# Patient Record
Sex: MALE
[De-identification: names, ages, dates, MRNs, and addresses within clinical notes are randomized per-mention and may not be internally consistent; named-entity substitution may affect disease eponyms.]

---

## 2022-01-25 ENCOUNTER — HOSPITAL ENCOUNTER (EMERGENCY)
Dept: HOSPITAL 5 - ED | Age: 32
LOS: 1 days | Discharge: HOME | End: 2022-01-26
Payer: SELF-PAY

## 2022-01-25 VITALS — DIASTOLIC BLOOD PRESSURE: 95 MMHG | SYSTOLIC BLOOD PRESSURE: 144 MMHG

## 2022-01-25 DIAGNOSIS — R07.89: Primary | ICD-10-CM

## 2022-01-25 LAB
BASOPHILS # (AUTO): 0 K/MM3 (ref 0–0.1)
BASOPHILS NFR BLD AUTO: 0.5 % (ref 0–1.8)
EOSINOPHIL # BLD AUTO: 0.1 K/MM3 (ref 0–0.4)
EOSINOPHIL NFR BLD AUTO: 2.6 % (ref 0–4.3)
HCT VFR BLD CALC: 42.6 % (ref 35.5–45.6)
HGB BLD-MCNC: 14.4 GM/DL (ref 11.8–15.2)
LYMPHOCYTES # BLD AUTO: 1.2 K/MM3 (ref 1.2–5.4)
LYMPHOCYTES NFR BLD AUTO: 21.8 % (ref 13.4–35)
MCHC RBC AUTO-ENTMCNC: 34 % (ref 32–34)
MCV RBC AUTO: 98 FL (ref 84–94)
MONOCYTES # (AUTO): 0.6 K/MM3 (ref 0–0.8)
MONOCYTES % (AUTO): 10.2 % (ref 0–7.3)
PLATELET # BLD: 189 K/MM3 (ref 140–440)
RBC # BLD AUTO: 4.36 M/MM3 (ref 3.65–5.03)

## 2022-01-25 PROCEDURE — 93005 ELECTROCARDIOGRAM TRACING: CPT

## 2022-01-25 PROCEDURE — 36415 COLL VENOUS BLD VENIPUNCTURE: CPT

## 2022-01-25 PROCEDURE — 80053 COMPREHEN METABOLIC PANEL: CPT

## 2022-01-25 PROCEDURE — 84484 ASSAY OF TROPONIN QUANT: CPT

## 2022-01-25 PROCEDURE — 93010 ELECTROCARDIOGRAM REPORT: CPT

## 2022-01-25 PROCEDURE — 71046 X-RAY EXAM CHEST 2 VIEWS: CPT

## 2022-01-25 PROCEDURE — 99284 EMERGENCY DEPT VISIT MOD MDM: CPT

## 2022-01-25 PROCEDURE — 85025 COMPLETE CBC W/AUTO DIFF WBC: CPT

## 2022-01-25 NOTE — XRAY REPORT
CHEST 2 VIEWS 



INDICATION / CLINICAL INFORMATION: Chest pain.



COMPARISON: None available.



FINDINGS:



SUPPORT DEVICES: None.

HEART / MEDIASTINUM: No significant abnormality. 

LUNGS / PLEURA: No significant pulmonary or pleural abnormality. No pneumothorax. 



ADDITIONAL FINDINGS: No significant additional findings.



IMPRESSION:

1. No acute findings.



Signer Name: Ty Mathew MD 

Signed: 1/25/2022 11:51 PM

Workstation Name: [id number]

## 2022-01-26 LAB
ALBUMIN SERPL-MCNC: 4.5 G/DL (ref 3.9–5)
ALT SERPL-CCNC: 13 UNITS/L (ref 7–56)
BUN SERPL-MCNC: 11 MG/DL (ref 9–20)
BUN/CREAT SERPL: 12 %
CALCIUM SERPL-MCNC: 9.3 MG/DL (ref 8.4–10.2)
HEMOLYSIS INDEX: 124

## 2022-01-26 NOTE — ELECTROCARDIOGRAPH REPORT
[facility]

                                       

Test Date:    2022               Test Time:    23:20:17

Pat Name:     RADHA MÉNDEZ        Department:   

Patient ID:   [id number]          Room:          

Gender:       M                        Technician:   SOLEDAD

:          1990               Requested By: ED DOC

Order Number: [id number]              Reading MD:   Blayne Hoover

                                 Measurements

Intervals                              Axis          

Rate:         65                       P:            -35

DC:           190                      QRS:          40

QRSD:         89                       T:            33

QT:           393                                    

QTc:          408                                    

                           Interpretive Statements

Sinus rhythm

No previous ECG available for comparison

Electronically Signed On 2022 8:51:56 EST by Blayne Hoover

## 2022-01-26 NOTE — EMERGENCY DEPARTMENT REPORT
ED General Adult HPI





- General


Chief complaint: Chest Pain


Stated complaint: Chest wall pain after heavy lifting


Time Seen by Provider: 01/26/22 06:58


Source: patient, RN notes reviewed


Mode of arrival: Ambulatory


Limitations: No Limitations





- History of Present Illness


Initial comments: 





The patient was evaluated in the emergency department for symptoms described in 

the history of present illness.  He/she was evaluated in the context of the 

global COVID-19 pandemic, which necessitated consideration that the patient 

might be at risk for infection with the virus that causes COVID-19.  

Institutional protocols and algorithms that pertain to the evaluation of 

patients at risk for COVID-19 are in a state of rapid change based on 

information released by regulatory bodies including the CDC and federal and 

state organizations.  These policies and algorithms were followed during the 

patient's care in the emergency department.  Please note that these policies, 

procedures and recommendations changed on a rapid basis.





This patient is a 31-year-old gentleman, who is right-hand dominant, who does a 

lot of heavy lifting for work, who presents to the ER today with a complaint of 

anterior chest wall pain, present for a few days, which does not radiate to the 

back, arms or neck.  He denies vomiting, diaphoresis, shortness of breath, 

travel, surgery, immobilization, DVT/PE risk factors.


-: Gradual, days(s)


Location: chest


Radiation: non-radiation


Severity scale (0 -10): 5


Quality: aching


Consistency: constant


Improves with: rest


Worsens with: movement (Movement and palpation)





- Related Data


                                  Previous Rx's











 Medication  Instructions  Recorded  Last Taken  Type


 


Acetaminophen [Non-Aspirin Extra 650 mg PO Q6HR PRN #30 tablet 01/26/22 Unknown 

Rx





Strength]    


 


Ibuprofen [Motrin] 400 mg PO Q8H PRN #30 tablet 01/26/22 Unknown Rx


 


Nicotine Polacrilex [Nicotine Gum] 4 mg BC PRN #1 pack 01/26/22 Unknown Rx











                                    Allergies











Allergy/AdvReac Type Severity Reaction Status Date / Time


 


shellfish derived Allergy  Unknown Verified 01/25/22 23:20














ED Review of Systems


ROS: 


Stated complaint: CHEST PAINS


Other details as noted in HPI





Comment: All other systems reviewed and negative


Cardiovascular: chest pain (Anterior chest wall pain)





ED Past Medical Hx





- Past Medical History


Previous Medical History?: No





- Medications


Home Medications: 


                                Home Medications











 Medication  Instructions  Recorded  Confirmed  Last Taken  Type


 


Acetaminophen [Non-Aspirin Extra 650 mg PO Q6HR PRN #30 tablet 01/26/22  Unknown

 Rx





Strength]     


 


Ibuprofen [Motrin] 400 mg PO Q8H PRN #30 tablet 01/26/22  Unknown Rx


 


Nicotine Polacrilex [Nicotine Gum] 4 mg BC PRN #1 pack 01/26/22  Unknown Rx














ED Physical Exam





- General


Limitations: No Limitations


General appearance: alert, in no apparent distress





- Head


Head exam: Present: atraumatic, normocephalic





- Eye


Eye exam: Present: normal appearance, EOMI.  Absent: nystagmus





- ENT


ENT exam: Present: normal exam, normal orophraynx, mucous membranes moist, 

normal external ear exam





- Neck


Neck exam: Present: normal inspection, full ROM.  Absent: tenderness, 

meningismus





- Respiratory


Respiratory exam: Present: normal lung sounds bilaterally, chest wall 

tenderness.  Absent: respiratory distress, wheezes, rales, rhonchi, stridor





- Cardiovascular


Cardiovascular Exam: Present: regular rate, normal rhythm, normal heart sounds. 

Absent: bradycardia, tachycardia, irregular rhythm, systolic murmur, diastolic 

murmur, rubs, gallop





- GI/Abdominal


GI/Abdominal exam: Present: soft, normal bowel sounds.  Absent: distended, 

tenderness, guarding, rebound, rigid, pulsatile mass





- Rectal


Rectal exam: Present: deferred





- Extremities Exam


Extremities exam: Present: normal inspection, full ROM, other (2+ pulses noted i

n the bilateral upper and lower extremities.  There is no palpable cord.   

negative Homans sign.  Muscular compartments are soft.  The pelvis is stable.). 

Absent: pedal edema, calf tenderness





- Back Exam


Back exam: Present: normal inspection, full ROM.  Absent: tenderness, CVA 

tenderness (R), CVA tenderness (L), paraspinal tenderness, vertebral tenderness





- Neurological Exam


Neurological exam: Present: alert, oriented X3, normal gait, other (No facial 

droop.  Tongue midline.  Extraocular movements intact bilaterally.  Facial 

sensation intact to light touch in V1, V2, V3 distribution bilaterally.  5 and a

5 strength in 4 extremities.  Sensation intact to light touch in 4 

extremities.).  Absent: motor sensory deficit





- Psychiatric


Psychiatric exam: Present: normal affect, normal mood





- Skin


Skin exam: Present: warm, dry, intact, normal color.  Absent: rash





ED Course


                                   Vital Signs











  01/25/22





  23:23


 


Temperature 98.9 F


 


Pulse Rate 72


 


Respiratory 20





Rate 


 


Blood Pressure 144/95





[Right] 


 


O2 Sat by Pulse 98





Oximetry 














ED Medical Decision Making





- Lab Data


Result diagrams: 


                                 01/25/22 23:34





                                 01/25/22 23:34








                                   Vital Signs











  01/25/22





  23:23


 


Temperature 98.9 F


 


Pulse Rate 72


 


Respiratory 20





Rate 


 


Blood Pressure 144/95





[Right] 


 


O2 Sat by Pulse 98





Oximetry 











                                   Lab Results











  01/25/22 01/25/22 01/26/22 Range/Units





  23:34 23:34 02:49 


 


WBC  5.6    (4.5-11.0)  K/mm3


 


RBC  4.36    (3.65-5.03)  M/mm3


 


Hgb  14.4    (11.8-15.2)  gm/dl


 


Hct  42.6    (35.5-45.6)  %


 


MCV  98 H    (84-94)  fl


 


MCH  33 H    (28-32)  pg


 


MCHC  34    (32-34)  %


 


RDW  12.9 L    (13.2-15.2)  %


 


Plt Count  189    (140-440)  K/mm3


 


Lymph % (Auto)  21.8    (13.4-35.0)  %


 


Mono % (Auto)  10.2 H    (0.0-7.3)  %


 


Eos % (Auto)  2.6    (0.0-4.3)  %


 


Baso % (Auto)  0.5    (0.0-1.8)  %


 


Lymph # (Auto)  1.2    (1.2-5.4)  K/mm3


 


Mono # (Auto)  0.6    (0.0-0.8)  K/mm3


 


Eos # (Auto)  0.1    (0.0-0.4)  K/mm3


 


Baso # (Auto)  0.0    (0.0-0.1)  K/mm3


 


Seg Neutrophils %  64.9    (40.0-70.0)  %


 


Seg Neutrophils #  3.6    (1.8-7.7)  K/mm3


 


Sodium   136 L   (137-145)  mmol/L


 


Potassium   3.8   (3.6-5.0)  mmol/L


 


Chloride   98.9   ()  mmol/L


 


Carbon Dioxide   25   (22-30)  mmol/L


 


Anion Gap   16   mmol/L


 


BUN   11   (9-20)  mg/dL


 


Creatinine   0.9   (0.8-1.3)  mg/dL


 


Estimated GFR   > 60   ml/min


 


BUN/Creatinine Ratio   12   %


 


Glucose   90   ()  mg/dL


 


Calcium   9.3   (8.4-10.2)  mg/dL


 


Total Bilirubin   0.80   (0.1-1.2)  mg/dL


 


AST   26   (5-40)  units/L


 


ALT   13   (7-56)  units/L


 


Alkaline Phosphatase   88   ()  units/L


 


Troponin T   < 0.010  < 0.010  (0.00-0.029)  ng/mL


 


Total Protein   7.0   (6.3-8.2)  g/dL


 


Albumin   4.5   (3.9-5)  g/dL


 


Albumin/Globulin Ratio   1.8   %














- EKG Data


-: EKG Interpreted by Me


EKG shows normal: sinus rhythm


Rate: normal





- EKG Data





01/26/22 07:48


The EKG is interpreted at 23: 20





Sinus rhythm, rate 65 bpm.  Normal axis, normal intervals, high left ventricular

 voltage.  Abnormal EKG.  Not a STEMI.  Age appropriate variant.  There is a 

normal P wave axis.





- Radiology Data


Radiology results: pending, report reviewed, image reviewed





CHEST 2 VIEWS  INDICATION / CLINICAL INFORMATION: Chest pain.  COMPARISON: None 

available.  FINDINGS:  SUPPORT DEVICES: None. HEART / MEDIASTINUM: No 

significant abnormality. LUNGS / PLEURA: No significant pulmonary or pleural 

abnormality. No pneumothorax.  ADDITIONAL FINDINGS: No significant additional 

findings.  IMPRESSION: 1. No acute findings.  Signer Name: MD PHU Monsalve

igned: 1/25/2022 10:51 PM Workstation Name: [id number]





- Medical Decision Making





Differential diagnosis, including but not limited to: GERD, gastritis, 

costochondritis





Assessment and plan: 31-year-old gentleman, with reproducible chest wall pain 

over the past few days, who is not currently tachycardic, tachypneic or hypoxic,

 who denies DVT and pulmonary embolism risk factors, who is low risk by Wells 

criteria for pulmonary embolism,  perc negative EKG unremarkable troponin 

negative x 2, symptoms present for a few days.





I suspect costochondritis.  This patient has reproducible chest wall pain and 

tenderness








 laboratory studies and x-ray of the chest were ordered prior to my personal 

evaluation of the patient.  They are unremarkable.





Patient has equal pulses in the upper and lower extremities, no pulsatile 

abdominal mass, and an unremarkable x-ray of the chest, therefore, aortic 

disease is very unlikely.





Patient at low risk for major adverse cardiac event as per heart score. 





Tylenol, Motrin, as needed Protonix, outpatient follow-up and expectant ma

nagement.  Of note, patient also endorsed desire to discontinue tobacco 

consumption.  Start nicotine gum.














Critical care attestation.: 


If time is entered above; I have spent that time in minutes in the direct care 

of this critically ill patient, excluding procedure time.








ED Disposition


Clinical Impression: 


 Chest wall pain, Encounter for tobacco use cessation counseling





Disposition: 01 HOME / SELF CARE / HOMELESS


Is pt being admited?: No


Does the pt Need Aspirin: No


Condition: Good


Instructions:  Costochondritis


Additional Instructions: 


Take the medications as needed and directed.  Discontinue tobacco consumption.  

Avoid consumption of heavy and spicy foods, drink plenty of water, consume 

plenty of fiber, vegetables, lean protein.





Follow-up with a primary care doctor or cardiologist within the next 5 to 7 

days.





Avoid heavy lifting, and alternate ice packs and heat packs as needed for 

physical pain.





Please return to the emergency room right away with new pain, worsened pain, 

migration of pain, projectile vomiting, change in mental status, confusion, 

inability tolerate liquid feeds, new, worsened or different symptoms not present

 on the initial emergency room evaluation





Patient may also take over-the-counter Pepcid or Protonix as needed for 

heartburn.


Prescriptions: 


Ibuprofen [Motrin] 400 mg PO Q8H PRN #30 tablet


 PRN Reason: Pain


Nicotine Polacrilex [Nicotine Gum] 4 mg BC PRN #1 pack


Acetaminophen [Non-Aspirin Extra Strength] 650 mg PO Q6HR PRN #30 tablet


 PRN Reason: Pain , Severe (7-10)


Referrals: 


[facility] [Provider Group] - 3-5 Days


[facility] [Provider Group] - 3-5 Days


Forms:  Work/School Release Form(ED)





Heart Score





- HEART Score


History: Slightly suspicious


EKG: Non-specific


Age: < 45


Risk factors: 1-2 risk factors


Troponin: < normal limit


HEART Score: 2





- EKG Read Time


Time EKG Completed: 23:20


EKG Read Time: 23:20





- Critical Actions


Critical Actions: 0-3 pts:0.9-1.7%risk of adverse cardiac event.Candidate for 

discharge